# Patient Record
Sex: FEMALE | Race: WHITE | Employment: OTHER | URBAN - METROPOLITAN AREA
[De-identification: names, ages, dates, MRNs, and addresses within clinical notes are randomized per-mention and may not be internally consistent; named-entity substitution may affect disease eponyms.]

---

## 2023-12-28 PROCEDURE — 93270 REMOTE 30 DAY ECG REV/REPORT: CPT | Performed by: INTERNAL MEDICINE

## 2024-01-09 ENCOUNTER — OFFICE VISIT (OUTPATIENT)
Dept: PULMONOLOGY | Age: 74
End: 2024-01-09
Payer: MEDICARE

## 2024-01-09 VITALS
BODY MASS INDEX: 29.23 KG/M2 | WEIGHT: 165 LBS | HEIGHT: 63 IN | DIASTOLIC BLOOD PRESSURE: 78 MMHG | SYSTOLIC BLOOD PRESSURE: 128 MMHG | HEART RATE: 70 BPM | OXYGEN SATURATION: 97 %

## 2024-01-09 DIAGNOSIS — J44.9 COPD, SEVERITY TO BE DETERMINED (HCC): Primary | ICD-10-CM

## 2024-01-09 PROCEDURE — 99213 OFFICE O/P EST LOW 20 MIN: CPT | Performed by: INTERNAL MEDICINE

## 2024-01-09 PROCEDURE — 1123F ACP DISCUSS/DSCN MKR DOCD: CPT | Performed by: INTERNAL MEDICINE

## 2024-01-09 RX ORDER — ALBUTEROL SULFATE 90 UG/1
2 AEROSOL, METERED RESPIRATORY (INHALATION) 4 TIMES DAILY PRN
Qty: 18 G | Refills: 5 | Status: SHIPPED | OUTPATIENT
Start: 2024-01-09

## 2024-01-09 ASSESSMENT — ENCOUNTER SYMPTOMS
RHINORRHEA: 0
COLOR CHANGE: 0
WHEEZING: 0
SORE THROAT: 0
BLOOD IN STOOL: 0
ABDOMINAL DISTENTION: 0
DIARRHEA: 0
VOICE CHANGE: 0
CONSTIPATION: 0
CHOKING: 0
SHORTNESS OF BREATH: 1
VOMITING: 0
SINUS PRESSURE: 0
CHEST TIGHTNESS: 0
STRIDOR: 0
COUGH: 0
ABDOMINAL PAIN: 0
BACK PAIN: 0
APNEA: 0

## 2024-01-09 NOTE — PROGRESS NOTES
Kinza Puente    YOB: 1950     Date of Service:  1/9/2024     Chief Complaint   Patient presents with    Follow-Up from Hospital    Other     95% ROOM AIR AT REST- 87% ROOM AIR WALKING - 97% 3L OXYGEN WALKING       HPI patient was recently hospitalized between 12/26 and 12/28 of COPD also was noted to have paroxysmal atrial fibrillation with RVR.  Started on Stiolto Respimat and Eliquis for AC.  Patient states that her dyspnea with exertion is improved with use of inhaler, does not have rescue inhaler.  She is currently using 3 L O2 continuously.  It is that she quit smoking cigarettes approximately 3 weeks ago since her hospitalization.    No Known Allergies  Outpatient Medications Marked as Taking for the 1/9/24 encounter (Office Visit) with Jet Hernandez MD   Medication Sig Dispense Refill    albuterol sulfate HFA (VENTOLIN HFA) 108 (90 Base) MCG/ACT inhaler Inhale 2 puffs into the lungs 4 times daily as needed for Wheezing 18 g 5    rosuvastatin (CRESTOR) 10 MG tablet Take 1 tablet by mouth nightly 30 tablet 3    tiotropium-olodaterol (STIOLTO) 2.5-2.5 MCG/ACT AERS Inhale 2 puffs into the lungs daily 1 each 2    apixaban (ELIQUIS) 5 MG TABS tablet Take 1 tablet by mouth 2 times daily 60 tablet 2    metoprolol tartrate (LOPRESSOR) 25 MG tablet Take 0.5 tablets by mouth 2 times daily 30 tablet 2    Multiple Vitamins-Minerals (THERAPEUTIC MULTIVITAMIN-MINERALS) tablet Take 1 tablet by mouth daily           There is no immunization history on file for this patient.    History reviewed. No pertinent past medical history.  History reviewed. No pertinent surgical history.  Family History   Problem Relation Age of Onset    Cancer Mother     Cancer Father        Review of Systems:  Review of Systems   Constitutional:  Positive for fatigue. Negative for activity change, appetite change, fever and unexpected weight change.   HENT:  Negative for congestion, ear discharge, ear pain, 
complains of pain/discomfort

## 2024-01-29 PROCEDURE — 93272 ECG/REVIEW INTERPRET ONLY: CPT | Performed by: INTERNAL MEDICINE

## 2024-01-31 NOTE — PROGRESS NOTES
estimated from 55 % to 60 %.   Normal diastolic function. Mild mitral regurgitation. Moderate to severe aortic stenosis.   Mild tricuspid regurgitation. RVSP is 20.31 mmHg with RAP of 3 mmHg.    GXT: None    Monitor: 12/23  NSR  AF burden 6%    Assessment:    1.Paroxysmal Atrial Fibrillation  - Currently in AF  - Continue metoprolol 12.5 mg BID  - [id number] score:high; [id number] Vasc score and anticoagulation discussed. High risk for stroke and thromboembolism. Anticoagulation is recommended. Risk of bleeding was discussed.  ~ Continue Eliquis 5 mg BID. Monitor for bleeding                 - She is back in atrial fibrillation today. Her monitor showed a burden of 6% (longest episode 43 minutes). She is symptomatic with her AF. She states that she can tell when she is out of rhythm. Will increase her BB dose and see if this helps. If no improvement, we can try her on an alternative AAD to help suppress her AF. Hopefully she will be a candidate for TAVR valve and we can consider ablation after that time    2. Valvular Insufficiency              - Noted on echo. Moderate to severe AS              - Dr. Gilmore following. Plans for repeat echo in a few weeks. She is very hopeful that she will be a candidate for a valve     3. COPD              - Stable, pt reports she wears 3L of oxygen at home (not wearing today, but she is 94% resting)    ~ Recommend she wear oxygen with her daily activity as needed              - Pulmonary following     4.Tobacco Abuse              - Pt has stopped smoking              - Discussed benefits of cessation and risks of continued use              - Tobacco cessation counseling completed    Plan:  Increase metoprolol to 25 mg twice per day  Call if symptoms do not improve  Complete echo as scheduled    F/U: Follow-up with Dr. Gilomre as scheduled and EP in 6 weeks  -Call [facility] at [phone] with any questions    Diet & Exercise:  The patient is counseled to follow a low

## 2024-02-05 DIAGNOSIS — I48.0 PAF (PAROXYSMAL ATRIAL FIBRILLATION) (HCC): Primary | ICD-10-CM

## 2024-02-05 DIAGNOSIS — I48.91 ATRIAL FIBRILLATION, UNSPECIFIED TYPE (HCC): ICD-10-CM

## 2024-02-20 ENCOUNTER — OFFICE VISIT (OUTPATIENT)
Dept: PULMONOLOGY | Age: 74
End: 2024-02-20
Payer: MEDICARE

## 2024-02-20 VITALS
OXYGEN SATURATION: 99 % | HEART RATE: 69 BPM | WEIGHT: 170 LBS | DIASTOLIC BLOOD PRESSURE: 70 MMHG | HEIGHT: 63 IN | BODY MASS INDEX: 30.12 KG/M2 | SYSTOLIC BLOOD PRESSURE: 128 MMHG

## 2024-02-20 DIAGNOSIS — J44.9 COPD, SEVERITY TO BE DETERMINED (HCC): Primary | ICD-10-CM

## 2024-02-20 PROCEDURE — 1123F ACP DISCUSS/DSCN MKR DOCD: CPT | Performed by: INTERNAL MEDICINE

## 2024-02-20 PROCEDURE — 99213 OFFICE O/P EST LOW 20 MIN: CPT | Performed by: INTERNAL MEDICINE

## 2024-02-20 ASSESSMENT — ENCOUNTER SYMPTOMS
APNEA: 0
ABDOMINAL DISTENTION: 0
SINUS PRESSURE: 0
COLOR CHANGE: 0
CONSTIPATION: 0
CHEST TIGHTNESS: 0
VOMITING: 0
BACK PAIN: 0
RHINORRHEA: 0
SORE THROAT: 0
ABDOMINAL PAIN: 0
DIARRHEA: 0
CHOKING: 0
STRIDOR: 0
VOICE CHANGE: 0
SHORTNESS OF BREATH: 1
COUGH: 0
WHEEZING: 0
BLOOD IN STOOL: 0

## 2024-02-20 NOTE — PROGRESS NOTES
Kinza Puente    YOB: 1950     Date of Service:  2/20/2024     Chief Complaint   Patient presents with    Follow-up     6 wk    COPD       HPI dyspnea with exertion has improved.  Patient actually is no longer using Stiolto Respimat, since she ran out of refills-only has albuterol inhaler which she uses as needed.  Currently using 3 L O2 continuously.  Has not yet obtained complete PFT study as ordered previously.  Quit smoking approximately 2 months ago.    No Known Allergies  Outpatient Medications Marked as Taking for the 2/20/24 encounter (Office Visit) with Jet Hernandez MD   Medication Sig Dispense Refill    tiotropium-olodaterol (STIOLTO) 2.5-2.5 MCG/ACT AERS Inhale 2 puffs into the lungs daily 1 each 3    albuterol sulfate HFA (VENTOLIN HFA) 108 (90 Base) MCG/ACT inhaler Inhale 2 puffs into the lungs 4 times daily as needed for Wheezing 18 g 5    rosuvastatin (CRESTOR) 10 MG tablet Take 1 tablet by mouth nightly 30 tablet 3    apixaban (ELIQUIS) 5 MG TABS tablet Take 1 tablet by mouth 2 times daily 60 tablet 2    metoprolol tartrate (LOPRESSOR) 25 MG tablet Take 0.5 tablets by mouth 2 times daily 30 tablet 2    Multiple Vitamins-Minerals (THERAPEUTIC MULTIVITAMIN-MINERALS) tablet Take 1 tablet by mouth daily           There is no immunization history on file for this patient.    History reviewed. No pertinent past medical history.  History reviewed. No pertinent surgical history.  Family History   Problem Relation Age of Onset    Cancer Mother     Cancer Father        Review of Systems:  Review of Systems   Constitutional:  Negative for activity change, appetite change, fatigue, fever and unexpected weight change.   HENT:  Negative for congestion, ear discharge, ear pain, postnasal drip, rhinorrhea, sinus pressure, sneezing, sore throat, tinnitus and voice change.    Respiratory:  Positive for shortness of breath. Negative for apnea, cough, choking, chest tightness,

## 2024-03-01 ENCOUNTER — OFFICE VISIT (OUTPATIENT)
Dept: CARDIOLOGY CLINIC | Age: 74
End: 2024-03-01
Payer: MEDICARE

## 2024-03-01 VITALS
DIASTOLIC BLOOD PRESSURE: 76 MMHG | HEIGHT: 63 IN | OXYGEN SATURATION: 94 % | WEIGHT: 166 LBS | BODY MASS INDEX: 29.41 KG/M2 | HEART RATE: 76 BPM | SYSTOLIC BLOOD PRESSURE: 130 MMHG

## 2024-03-01 DIAGNOSIS — J44.9 CHRONIC OBSTRUCTIVE PULMONARY DISEASE, UNSPECIFIED COPD TYPE (HCC): ICD-10-CM

## 2024-03-01 DIAGNOSIS — I35.0 NONRHEUMATIC AORTIC VALVE STENOSIS: ICD-10-CM

## 2024-03-01 DIAGNOSIS — I48.0 PAF (PAROXYSMAL ATRIAL FIBRILLATION) (HCC): Primary | ICD-10-CM

## 2024-03-01 DIAGNOSIS — Z72.0 TOBACCO USE: ICD-10-CM

## 2024-03-01 PROCEDURE — 99214 OFFICE O/P EST MOD 30 MIN: CPT | Performed by: NURSE PRACTITIONER

## 2024-03-01 PROCEDURE — 93000 ELECTROCARDIOGRAM COMPLETE: CPT | Performed by: NURSE PRACTITIONER

## 2024-03-01 PROCEDURE — 1123F ACP DISCUSS/DSCN MKR DOCD: CPT | Performed by: NURSE PRACTITIONER

## 2024-03-13 ENCOUNTER — PATIENT MESSAGE (OUTPATIENT)
Dept: CARDIOLOGY CLINIC | Age: 74
End: 2024-03-13

## 2024-03-14 NOTE — TELEPHONE ENCOUNTER
From: Kinza Puente  To: Kyle Houston  Sent: 3/13/2024 6:31 PM EDT  Subject: Question about prescription     My prescription 'IC METOPROLOL TARTRATE' 25 MG. It is giving my fibulation. I am not feeling well. I am wondering If I can change prescription??

## 2024-03-15 NOTE — PROGRESS NOTES
*AF  Status Plan   Paroxysmal, monitor 2/24 Per EP, continue eliquis   *TOB  Status Prior smoker   PFTs NA   Plan Continued avoidance of 1st and 2nd hand smoke, counseled on risks/cessation  Needs PFTs   *FOLLOWUP  6 months

## 2024-03-19 ENCOUNTER — HOSPITAL ENCOUNTER (OUTPATIENT)
Dept: NON INVASIVE DIAGNOSTICS | Age: 74
Discharge: HOME | End: 2024-03-19
Payer: MEDICARE

## 2024-03-19 DIAGNOSIS — I35.0 AORTIC VALVE STENOSIS, NONRHEUMATIC: ICD-10-CM

## 2024-03-19 PROCEDURE — 93306 TTE W/DOPPLER COMPLETE: CPT

## 2024-03-21 ENCOUNTER — OFFICE VISIT (OUTPATIENT)
Dept: CARDIOLOGY CLINIC | Age: 74
End: 2024-03-21
Payer: MEDICARE

## 2024-03-21 VITALS
BODY MASS INDEX: 29.02 KG/M2 | DIASTOLIC BLOOD PRESSURE: 70 MMHG | HEIGHT: 63 IN | WEIGHT: 163.8 LBS | SYSTOLIC BLOOD PRESSURE: 112 MMHG | OXYGEN SATURATION: 96 % | HEART RATE: 71 BPM

## 2024-03-21 DIAGNOSIS — J44.9 CHRONIC OBSTRUCTIVE PULMONARY DISEASE, UNSPECIFIED COPD TYPE (HCC): ICD-10-CM

## 2024-03-21 DIAGNOSIS — R06.02 SOB (SHORTNESS OF BREATH): ICD-10-CM

## 2024-03-21 DIAGNOSIS — I48.0 PAF (PAROXYSMAL ATRIAL FIBRILLATION) (HCC): ICD-10-CM

## 2024-03-21 DIAGNOSIS — I35.0 AORTIC VALVE STENOSIS, NONRHEUMATIC: Primary | ICD-10-CM

## 2024-03-21 PROCEDURE — 99214 OFFICE O/P EST MOD 30 MIN: CPT | Performed by: INTERNAL MEDICINE

## 2024-03-21 PROCEDURE — 1123F ACP DISCUSS/DSCN MKR DOCD: CPT | Performed by: INTERNAL MEDICINE

## 2024-03-27 ENCOUNTER — HOSPITAL ENCOUNTER (OUTPATIENT)
Dept: PULMONOLOGY | Age: 74
Discharge: HOME | End: 2024-03-27
Payer: MEDICARE

## 2024-03-27 DIAGNOSIS — J44.9 COPD, SEVERITY TO BE DETERMINED (HCC): ICD-10-CM

## 2024-03-27 DIAGNOSIS — R06.00 DYSPNEA, UNSPECIFIED TYPE: ICD-10-CM

## 2024-03-27 LAB
DLCO %PRED: 60 %
DLCO PRED: NORMAL
DLCO/VA %PRED: 60 %
DLCO/VA PRED: NORMAL
DLCO/VA: 2.59 ML/MIN/MMHG
DLCO: 10.44 ML/MIN/MMHG
EXPIRATORY TIME-POST: NORMAL
EXPIRATORY TIME: NORMAL
FEF 25-75 %CHNG: NORMAL
FEF 25-75 POST %PRED: NORMAL
FEF 25-75% %PRED-PRE: NORMAL
FEF 25-75% PRED: NORMAL
FEF 25-75-POST: NORMAL
FEF 25-75-PRE: NORMAL
FEV1 %PRED-POST: 62 %
FEV1 %PRED-PRE: 60 %
FEV1 PRED: NORMAL
FEV1-POST: NORMAL
FEV1-PRE: NORMAL
FEV1/FVC %PRED-POST: NORMAL
FEV1/FVC %PRED-PRE: NORMAL
FEV1/FVC PRED: NORMAL
FEV1/FVC-POST: 63 %
FEV1/FVC-PRE: 62 %
FVC %PRED-POST: 77 L
FVC %PRED-PRE: 75 %
FVC PRED: NORMAL
FVC-POST: 1.89 L
FVC-PRE: 1.83 L
GAW %PRED: NORMAL
GAW PRED: NORMAL
GAW: NORMAL
IC PRE %PRED: NORMAL
IC PRED: NORMAL
IC: NORMAL
MEP: NORMAL
MIP: NORMAL
MVV %PRED-PRE: NORMAL
MVV PRED: NORMAL
MVV-PRE: NORMAL
PEF %PRED-POST: NORMAL
PEF %PRED-PRE: NORMAL
PEF PRED: NORMAL
PEF%CHNG: NORMAL
PEF-POST: NORMAL
PEF-PRE: NORMAL
RAW %PRED: NORMAL
RAW PRED: NORMAL
RAW: NORMAL
RV PRE %PRED: NORMAL
RV PRED: NORMAL
RV: NORMAL
SVC %PRED: NORMAL
SVC PRED: NORMAL
SVC: NORMAL
TLC PRE %PRED: 102 %
TLC PRED: NORMAL
TLC: NORMAL
VA %PRED: 99 %
VA PRED: NORMAL
VA: 4.03 L
VTG %PRED: NORMAL
VTG PRED: NORMAL
VTG: NORMAL

## 2024-03-27 PROCEDURE — 94664 DEMO&/EVAL PT USE INHALER: CPT

## 2024-03-27 PROCEDURE — 94726 PLETHYSMOGRAPHY LUNG VOLUMES: CPT

## 2024-03-27 PROCEDURE — 94729 DIFFUSING CAPACITY: CPT

## 2024-03-27 PROCEDURE — 94640 AIRWAY INHALATION TREATMENT: CPT

## 2024-03-27 PROCEDURE — 6370000000 HC RX 637 (ALT 250 FOR IP): Performed by: INTERNAL MEDICINE

## 2024-03-27 PROCEDURE — 94060 EVALUATION OF WHEEZING: CPT

## 2024-03-27 RX ORDER — ALBUTEROL SULFATE 90 UG/1
4 AEROSOL, METERED RESPIRATORY (INHALATION) ONCE
Status: COMPLETED | OUTPATIENT
Start: 2024-03-27 | End: 2024-03-27

## 2024-03-27 RX ADMIN — ALBUTEROL SULFATE 4 PUFF: 90 AEROSOL, METERED RESPIRATORY (INHALATION) at 12:25

## 2024-03-27 ASSESSMENT — PULMONARY FUNCTION TESTS
FEV1/FVC_POST: 63
FEV1/FVC_PRE: 62
FVC_PERCENT_PREDICTED_POST: 77
FVC_PERCENT_PREDICTED_PRE: 75
FEV1_PERCENT_PREDICTED_PRE: 60
FVC_PRE: 1.83
FVC_POST: 1.89
FEV1_PERCENT_PREDICTED_POST: 62

## 2024-03-28 NOTE — PROCEDURES
Spirometry was acceptable and reproducible by ATS standards    Spirometry  Spirometry is normal.  Spirometry shows moderately severe obstructive defect.    Bronchodilator  There is no response to bronchodilator demonstrated.   [Increase in FEV1 and/or FVC => 12% of control and => 200 ml]    Lung Volumes  Lung volumes are normal.    Diffusing Capacity  Diffusing capacity isdecreased.                         [>60% and <LLN]      Overall Interpretation  Moderate obstruction with FEV1 of 60% without bronchodilator response.  Diffusion capacity reduced to 60%.  Normal lung volumes.  This is consistent with moderate COPD.    Pulmonary Function Testing Results:    FEV1 %Pred-Pre   Date Value Ref Range Status   03/27/2024 60 % Final     FEV1 %Pred-Post   Date Value Ref Range Status   03/27/2024 62 % Final     FEV1/FVC-Pre   Date Value Ref Range Status   03/27/2024 62 % Final     FEV1/FVC-Post   Date Value Ref Range Status   03/27/2024 63 % Final     TLC Pre %Pred   Date Value Ref Range Status   03/27/2024 102 % Final     DLCO %Pred   Date Value Ref Range Status   03/27/2024 60 % Final     DLCO/VA %Pred   Date Value Ref Range Status   03/27/2024 60 % Final             Obstruction severity and Restriction Severity using FEV1 %  Gold Stage Severity per ERS/ATS FEV1 % per ERS/ATS   GOLD I:  FEV1>80% Mild COPD >70   GOLD II:  FEV1 50-79% Moderate 50-70   GOLD III:  FEV1 30-49% Severe 35-50   GOLD IV:  FEV1 <30 Very Severe  <35       DCLO:  Normal:  %  Mild:  65-70%  Moderate:  41-60%  Severe:  <40%      PFT data will be scanned into the procedure tab in epic under this encounter    Juventino Drake MD  [facility]

## 2024-04-18 PROBLEM — J96.02 ACUTE RESPIRATORY FAILURE WITH HYPOXIA AND HYPERCAPNIA (HCC): Status: RESOLVED | Noted: 2023-12-27 | Resolved: 2024-04-18

## 2024-04-18 PROBLEM — J96.01 ACUTE RESPIRATORY FAILURE WITH HYPOXIA AND HYPERCAPNIA (HCC): Status: RESOLVED | Noted: 2023-12-27 | Resolved: 2024-04-18

## 2024-04-18 NOTE — PROGRESS NOTES
myocardial infarction and death were discussed in detail. I explained the success rate considering possible need for a second procedure is about 60-70% and with addition of anti arrhythmics is about 80%. We discussed the need for repeat procedure. On average patients need more than one ablation procedure    -------- > The patient opted to proceed with the ablation. Hold eliquis 1 day prior    2. Aortic Stenosis              - Noted on echo. Moderate AS              - Dr. Gilmore following     3. COPD              - Moderate per PFTs  - Stable, on room air              - Pulmonary following     4.Tobacco Abuse              - Pt has stopped smoking              - Discussed benefits of cessation and risks of continued use              - Tobacco cessation counseling completed    5. Leg Pain/Cramps   - Occurs at night. Hold statin for one week. If pain goes away, consider switching to lower intensity statin. If pain does not go away, resume current statin    Plan:  No changes. Ok to stop statin for one week to see if leg pains go away  Will arrange ablation    F/U: Follow-up with EP following ablation  -Call [facility] at [phone] with any questions    Diet & Exercise:  The patient is counseled to follow a low salt diet to assure blood pressure remains controlled for cardiovascular risk factor modification  The patient is counseled to avoid excess caffeine, and energy drinks as this may exacerbated ectopy and arrhythmia  The patient is counseled to lose weight to control cardiovascular risk factors  Exercise program discussed: To improve overall cardiovascular health, the patient is instructed to increase cardiovascular related activities with a goal of 150 min/week of moderate level activity or 10,000 steps per day. Encouraged to perform as much activity as tolerated    I have addressed the patient's cardiac risk factors and adjusted pharmacologic treatment as needed. In addition, I have reinforced the

## 2024-04-22 ENCOUNTER — OFFICE VISIT (OUTPATIENT)
Dept: CARDIOLOGY CLINIC | Age: 74
End: 2024-04-22
Payer: MEDICARE

## 2024-04-22 VITALS
HEART RATE: 56 BPM | OXYGEN SATURATION: 96 % | SYSTOLIC BLOOD PRESSURE: 130 MMHG | WEIGHT: 170.4 LBS | BODY MASS INDEX: 30.19 KG/M2 | DIASTOLIC BLOOD PRESSURE: 82 MMHG | HEIGHT: 63 IN

## 2024-04-22 DIAGNOSIS — I48.0 PAF (PAROXYSMAL ATRIAL FIBRILLATION) (HCC): Primary | ICD-10-CM

## 2024-04-22 DIAGNOSIS — I35.0 NONRHEUMATIC AORTIC VALVE STENOSIS: ICD-10-CM

## 2024-04-22 DIAGNOSIS — J44.9 CHRONIC OBSTRUCTIVE PULMONARY DISEASE, UNSPECIFIED COPD TYPE (HCC): ICD-10-CM

## 2024-04-22 DIAGNOSIS — Z72.0 TOBACCO USE: ICD-10-CM

## 2024-04-22 PROCEDURE — 99214 OFFICE O/P EST MOD 30 MIN: CPT | Performed by: NURSE PRACTITIONER

## 2024-04-22 PROCEDURE — 1123F ACP DISCUSS/DSCN MKR DOCD: CPT | Performed by: NURSE PRACTITIONER

## 2024-04-22 PROCEDURE — 93000 ELECTROCARDIOGRAM COMPLETE: CPT | Performed by: NURSE PRACTITIONER

## 2024-05-20 ENCOUNTER — TELEPHONE (OUTPATIENT)
Dept: CARDIOLOGY CLINIC | Age: 74
End: 2024-05-20

## 2024-05-20 NOTE — TELEPHONE ENCOUNTER
LVM for pt to advise I don't have any dates avail for procedure. I will be in contact soon to get her scheduled.     Procedure - EP Study/HALLIE/Afib Abl   Date:  Arrival time:  Procedure time:      ATRIAL FIBRILLATION ABLATION INFORMATION  Our  will be contacting you with a date and time for your procedure  The morning of your ablation you will need to check in at the registration desk in the main lobby.                 PRE-PROCEDURE INSTRUCTIONS -   -Do not eat or drink anything after midnight the night before your ablation.  -You will need to have a responsible adult to drive you home.  -Your nurse will provide you with discharge instructions.  -You will need to hold your Eliquis for 1 day  -If you are taking a diuretic (water pill) please hold the morning of the procedure  -If you take long acting insulin, take 1/2 the dose the evening before or morning of depending on when you take your dosage.  -You may take all of your other medications with a sip of water.     You will be scheduled for a 6-8 week follow up with cardiology.  This will be done prior to your discharge instructions.     If you have any questions regarding the procedure itself or medications, please call [phone] and ask to speak to an EP nurse.

## 2024-05-24 ENCOUNTER — OFFICE VISIT (OUTPATIENT)
Dept: PULMONOLOGY | Age: 74
End: 2024-05-24

## 2024-05-24 VITALS
HEART RATE: 63 BPM | WEIGHT: 172.2 LBS | HEIGHT: 63 IN | DIASTOLIC BLOOD PRESSURE: 80 MMHG | SYSTOLIC BLOOD PRESSURE: 132 MMHG | BODY MASS INDEX: 30.51 KG/M2 | OXYGEN SATURATION: 90 %

## 2024-05-24 DIAGNOSIS — F17.211 CIGARETTE NICOTINE DEPENDENCE IN REMISSION: ICD-10-CM

## 2024-05-24 DIAGNOSIS — J44.9 COPD, MODERATE (HCC): Primary | ICD-10-CM

## 2024-05-24 DIAGNOSIS — I48.0 PAF (PAROXYSMAL ATRIAL FIBRILLATION) (HCC): ICD-10-CM

## 2024-05-24 ASSESSMENT — ENCOUNTER SYMPTOMS
DIARRHEA: 0
BACK PAIN: 0
COUGH: 1
VOICE CHANGE: 0
RHINORRHEA: 0
COLOR CHANGE: 0
CHOKING: 0
ABDOMINAL DISTENTION: 0
SINUS PRESSURE: 0
WHEEZING: 0
SORE THROAT: 0
CHEST TIGHTNESS: 0
CONSTIPATION: 0
APNEA: 0
BLOOD IN STOOL: 0
ABDOMINAL PAIN: 0
STRIDOR: 0
SHORTNESS OF BREATH: 1
VOMITING: 0

## 2024-05-24 NOTE — PROGRESS NOTES
Kinza Puente    YOB: 1950     Date of Service:  5/24/2024     Chief Complaint   Patient presents with    COPD    Results     PFT    Shortness of Breath     With minimal exertion        HPI patient states that she continues to be dyspneic with any form of exertion, has increased fatigue and congested cough.  States that she returned her home oxygen recently, since she did not feel that she needed it anymore.  Ran out of refill a few weeks ago of Stiolto, had some increased shortness of breath and cough-now better since she is back on Stiolto.  Quit smoking in December.    No Known Allergies  Outpatient Medications Marked as Taking for the 5/24/24 encounter (Office Visit) with Jet Hernandez MD   Medication Sig Dispense Refill    tiotropium-olodaterol (STIOLTO) 2.5-2.5 MCG/ACT AERS Inhale 2 puffs into the lungs daily 1 each 3    metoprolol tartrate (LOPRESSOR) 25 MG tablet TAKE 1/2 TABLET TWICE A DAY BY MOUTH 90 tablet 0    apixaban (ELIQUIS) 5 MG TABS tablet Take 1 tablet by mouth 2 times daily 60 tablet 2    rosuvastatin (CRESTOR) 10 MG tablet Take 1 tablet by mouth nightly 30 tablet 3    albuterol sulfate HFA (VENTOLIN HFA) 108 (90 Base) MCG/ACT inhaler Inhale 2 puffs into the lungs 4 times daily as needed for Wheezing 18 g 5    Multiple Vitamins-Minerals (THERAPEUTIC MULTIVITAMIN-MINERALS) tablet Take 1 tablet by mouth daily           There is no immunization history on file for this patient.    No past medical history on file.  No past surgical history on file.  Family History   Problem Relation Age of Onset    Cancer Mother     Cancer Father        Review of Systems:  Review of Systems   Constitutional:  Positive for fatigue. Negative for activity change, appetite change, fever and unexpected weight change.   HENT:  Negative for congestion, ear discharge, ear pain, postnasal drip, rhinorrhea, sinus pressure, sneezing, sore throat, tinnitus and voice change.    Respiratory:

## 2024-06-13 ENCOUNTER — TELEPHONE (OUTPATIENT)
Dept: CARDIOLOGY CLINIC | Age: 74
End: 2024-06-13

## 2024-06-13 DIAGNOSIS — I48.0 PAROXYSMAL ATRIAL FIBRILLATION (HCC): Primary | ICD-10-CM

## 2024-06-13 NOTE — TELEPHONE ENCOUNTER
Spoke with the pt and got her scheduled for procedure. We went over instructions below and she verbalized understanding.     ATRIAL FIBRILLATION ABLATION INFORMATION  Date: 8/6/24  Arrival time: 9:30 am   Procedure time: 10:30 am     Our  will be contacting you with a date and time for your procedure  The morning of your ablation you will need to check in at the registration desk in the main lobby.                 PRE-PROCEDURE INSTRUCTIONS -   -Do not eat or drink anything after midnight the night before your ablation.  -You will need to have a responsible adult to drive you home.  -Your nurse will provide you with discharge instructions.  -You will need to hold your Eliquis for 1 day  -If you are taking a diuretic (water pill) please hold the morning of the procedure  -If you take long acting insulin, take 1/2 the dose the evening before or morning of depending on when you take your dosage.  -You may take all of your other medications with a sip of water.     You will be scheduled for a 6-8 week follow up with cardiology.  This will be done prior to your discharge instructions.     If you have any questions regarding the procedure itself or medications, please call [phone] and ask to speak to an EP nurse.     Qgenda updated / updated cupid, carto, epic / emailed cath lab

## 2024-08-06 ENCOUNTER — HOSPITAL ENCOUNTER (OUTPATIENT)
Age: 74
Setting detail: OUTPATIENT SURGERY
Discharge: HOME | End: 2024-08-06
Attending: INTERNAL MEDICINE | Admitting: INTERNAL MEDICINE
Payer: MEDICARE

## 2024-08-06 ENCOUNTER — ANESTHESIA (OUTPATIENT)
Age: 74
End: 2024-08-06
Payer: MEDICARE

## 2024-08-06 ENCOUNTER — ANESTHESIA EVENT (OUTPATIENT)
Age: 74
End: 2024-08-06
Payer: MEDICARE

## 2024-08-06 ENCOUNTER — APPOINTMENT (OUTPATIENT)
Age: 74
End: 2024-08-06
Attending: INTERNAL MEDICINE
Payer: MEDICARE

## 2024-08-06 VITALS
HEART RATE: 73 BPM | SYSTOLIC BLOOD PRESSURE: 162 MMHG | OXYGEN SATURATION: 95 % | TEMPERATURE: 96.9 F | HEIGHT: 63 IN | WEIGHT: 172 LBS | DIASTOLIC BLOOD PRESSURE: 96 MMHG | BODY MASS INDEX: 30.48 KG/M2 | RESPIRATION RATE: 24 BRPM

## 2024-08-06 DIAGNOSIS — I48.0 PAROXYSMAL ATRIAL FIBRILLATION (HCC): ICD-10-CM

## 2024-08-06 LAB
ANION GAP SERPL CALCULATED.3IONS-SCNC: 11 MMOL/L (ref 3–16)
BUN SERPL-MCNC: 14 MG/DL (ref 7–20)
CALCIUM SERPL-MCNC: 9.2 MG/DL (ref 8.3–10.6)
CHLORIDE SERPL-SCNC: 104 MMOL/L (ref 99–110)
CO2 SERPL-SCNC: 25 MMOL/L (ref 21–32)
CREAT SERPL-MCNC: <0.5 MG/DL (ref 0.6–1.2)
DEPRECATED RDW RBC AUTO: 14.3 % (ref 12.4–15.4)
ECHO BSA: 1.86 M2
GFR SERPLBLD CREATININE-BSD FMLA CKD-EPI: >90 ML/MIN/{1.73_M2}
GLUCOSE SERPL-MCNC: 114 MG/DL (ref 70–99)
HCT VFR BLD AUTO: 38.5 % (ref 36–48)
HGB BLD-MCNC: 12.7 G/DL (ref 12–16)
MCH RBC QN AUTO: 28.8 PG (ref 26–34)
MCHC RBC AUTO-ENTMCNC: 32.9 G/DL (ref 31–36)
MCV RBC AUTO: 87.4 FL (ref 80–100)
PLATELET # BLD AUTO: 197 K/UL (ref 135–450)
PMV BLD AUTO: 7.6 FL (ref 5–10.5)
POC ACT LR: 304 SEC
POC ACT LR: 374 SEC
POC ACT LR: 375 SEC
POTASSIUM SERPL-SCNC: 4.2 MMOL/L (ref 3.5–5.1)
RBC # BLD AUTO: 4.4 M/UL (ref 4–5.2)
SODIUM SERPL-SCNC: 140 MMOL/L (ref 136–145)
WBC # BLD AUTO: 5.7 K/UL (ref 4–11)

## 2024-08-06 PROCEDURE — 93312 ECHO TRANSESOPHAGEAL: CPT | Performed by: INTERNAL MEDICINE

## 2024-08-06 PROCEDURE — 2500000003 HC RX 250 WO HCPCS: Performed by: NURSE ANESTHETIST, CERTIFIED REGISTERED

## 2024-08-06 PROCEDURE — 2709999900 HC NON-CHARGEABLE SUPPLY: Performed by: INTERNAL MEDICINE

## 2024-08-06 PROCEDURE — 6360000002 HC RX W HCPCS: Performed by: NURSE ANESTHETIST, CERTIFIED REGISTERED

## 2024-08-06 PROCEDURE — 93656 COMPRE EP EVAL ABLTJ ATR FIB: CPT | Performed by: INTERNAL MEDICINE

## 2024-08-06 PROCEDURE — 93312 ECHO TRANSESOPHAGEAL: CPT

## 2024-08-06 PROCEDURE — 93320 DOPPLER ECHO COMPLETE: CPT | Performed by: INTERNAL MEDICINE

## 2024-08-06 PROCEDURE — 80048 BASIC METABOLIC PNL TOTAL CA: CPT

## 2024-08-06 PROCEDURE — C1894 INTRO/SHEATH, NON-LASER: HCPCS | Performed by: INTERNAL MEDICINE

## 2024-08-06 PROCEDURE — 3700000001 HC ADD 15 MINUTES (ANESTHESIA): Performed by: INTERNAL MEDICINE

## 2024-08-06 PROCEDURE — C1760 CLOSURE DEV, VASC: HCPCS | Performed by: INTERNAL MEDICINE

## 2024-08-06 PROCEDURE — 7100000000 HC PACU RECOVERY - FIRST 15 MIN: Performed by: INTERNAL MEDICINE

## 2024-08-06 PROCEDURE — C1759 CATH, INTRA ECHOCARDIOGRAPHY: HCPCS | Performed by: INTERNAL MEDICINE

## 2024-08-06 PROCEDURE — 93325 DOPPLER ECHO COLOR FLOW MAPG: CPT | Performed by: INTERNAL MEDICINE

## 2024-08-06 PROCEDURE — 7100000001 HC PACU RECOVERY - ADDTL 15 MIN: Performed by: INTERNAL MEDICINE

## 2024-08-06 PROCEDURE — C1769 GUIDE WIRE: HCPCS | Performed by: INTERNAL MEDICINE

## 2024-08-06 PROCEDURE — C1766 INTRO/SHEATH,STRBLE,NON-PEEL: HCPCS | Performed by: INTERNAL MEDICINE

## 2024-08-06 PROCEDURE — 3700000000 HC ANESTHESIA ATTENDED CARE: Performed by: INTERNAL MEDICINE

## 2024-08-06 PROCEDURE — 2500000003 HC RX 250 WO HCPCS: Performed by: INTERNAL MEDICINE

## 2024-08-06 PROCEDURE — C1730 CATH, EP, 19 OR FEW ELECT: HCPCS | Performed by: INTERNAL MEDICINE

## 2024-08-06 PROCEDURE — 7100000010 HC PHASE II RECOVERY - FIRST 15 MIN: Performed by: INTERNAL MEDICINE

## 2024-08-06 PROCEDURE — 36415 COLL VENOUS BLD VENIPUNCTURE: CPT

## 2024-08-06 PROCEDURE — 2580000003 HC RX 258: Performed by: NURSE ANESTHETIST, CERTIFIED REGISTERED

## 2024-08-06 PROCEDURE — 93005 ELECTROCARDIOGRAM TRACING: CPT | Performed by: INTERNAL MEDICINE

## 2024-08-06 PROCEDURE — C1733 CATH, EP, OTHR THAN COOL-TIP: HCPCS | Performed by: INTERNAL MEDICINE

## 2024-08-06 PROCEDURE — 85027 COMPLETE CBC AUTOMATED: CPT

## 2024-08-06 PROCEDURE — 7100000011 HC PHASE II RECOVERY - ADDTL 15 MIN: Performed by: INTERNAL MEDICINE

## 2024-08-06 PROCEDURE — 85347 COAGULATION TIME ACTIVATED: CPT

## 2024-08-06 RX ORDER — ATROPINE SULFATE 0.4 MG/ML
INJECTION, SOLUTION INTRAMUSCULAR; INTRAVENOUS; SUBCUTANEOUS PRN
Status: DISCONTINUED | OUTPATIENT
Start: 2024-08-06 | End: 2024-08-06 | Stop reason: SDUPTHER

## 2024-08-06 RX ORDER — FENTANYL CITRATE 50 UG/ML
INJECTION, SOLUTION INTRAMUSCULAR; INTRAVENOUS PRN
Status: DISCONTINUED | OUTPATIENT
Start: 2024-08-06 | End: 2024-08-06 | Stop reason: SDUPTHER

## 2024-08-06 RX ORDER — LIDOCAINE HYDROCHLORIDE 20 MG/ML
INJECTION, SOLUTION EPIDURAL; INFILTRATION; INTRACAUDAL; PERINEURAL PRN
Status: DISCONTINUED | OUTPATIENT
Start: 2024-08-06 | End: 2024-08-06 | Stop reason: SDUPTHER

## 2024-08-06 RX ORDER — SODIUM CHLORIDE 9 MG/ML
INJECTION, SOLUTION INTRAVENOUS CONTINUOUS PRN
Status: DISCONTINUED | OUTPATIENT
Start: 2024-08-06 | End: 2024-08-06 | Stop reason: SDUPTHER

## 2024-08-06 RX ORDER — PROPOFOL 10 MG/ML
INJECTION, EMULSION INTRAVENOUS PRN
Status: DISCONTINUED | OUTPATIENT
Start: 2024-08-06 | End: 2024-08-06 | Stop reason: SDUPTHER

## 2024-08-06 RX ORDER — VECURONIUM BROMIDE 1 MG/ML
INJECTION, POWDER, LYOPHILIZED, FOR SOLUTION INTRAVENOUS PRN
Status: DISCONTINUED | OUTPATIENT
Start: 2024-08-06 | End: 2024-08-06 | Stop reason: SDUPTHER

## 2024-08-06 RX ORDER — MIDAZOLAM HYDROCHLORIDE 1 MG/ML
INJECTION INTRAMUSCULAR; INTRAVENOUS PRN
Status: DISCONTINUED | OUTPATIENT
Start: 2024-08-06 | End: 2024-08-06 | Stop reason: SDUPTHER

## 2024-08-06 RX ORDER — SODIUM CHLORIDE 0.9 % (FLUSH) 0.9 %
5-40 SYRINGE (ML) INJECTION EVERY 12 HOURS SCHEDULED
Status: DISCONTINUED | OUTPATIENT
Start: 2024-08-06 | End: 2024-08-06 | Stop reason: HOSPADM

## 2024-08-06 RX ORDER — SUCCINYLCHOLINE/SOD CL,ISO/PF 200MG/10ML
SYRINGE (ML) INTRAVENOUS PRN
Status: DISCONTINUED | OUTPATIENT
Start: 2024-08-06 | End: 2024-08-06 | Stop reason: SDUPTHER

## 2024-08-06 RX ORDER — SODIUM CHLORIDE 0.9 % (FLUSH) 0.9 %
5-40 SYRINGE (ML) INJECTION PRN
Status: DISCONTINUED | OUTPATIENT
Start: 2024-08-06 | End: 2024-08-06 | Stop reason: HOSPADM

## 2024-08-06 RX ORDER — PROTAMINE SULFATE 10 MG/ML
INJECTION, SOLUTION INTRAVENOUS PRN
Status: DISCONTINUED | OUTPATIENT
Start: 2024-08-06 | End: 2024-08-06 | Stop reason: SDUPTHER

## 2024-08-06 RX ORDER — DEXAMETHASONE SODIUM PHOSPHATE 4 MG/ML
INJECTION, SOLUTION INTRA-ARTICULAR; INTRALESIONAL; INTRAMUSCULAR; INTRAVENOUS; SOFT TISSUE PRN
Status: DISCONTINUED | OUTPATIENT
Start: 2024-08-06 | End: 2024-08-06 | Stop reason: SDUPTHER

## 2024-08-06 RX ORDER — SODIUM CHLORIDE 9 MG/ML
INJECTION, SOLUTION INTRAVENOUS PRN
Status: DISCONTINUED | OUTPATIENT
Start: 2024-08-06 | End: 2024-08-06 | Stop reason: HOSPADM

## 2024-08-06 RX ORDER — HEPARIN SODIUM 1000 [USP'U]/ML
INJECTION, SOLUTION INTRAVENOUS; SUBCUTANEOUS PRN
Status: DISCONTINUED | OUTPATIENT
Start: 2024-08-06 | End: 2024-08-06 | Stop reason: SDUPTHER

## 2024-08-06 RX ORDER — ONDANSETRON 2 MG/ML
INJECTION INTRAMUSCULAR; INTRAVENOUS PRN
Status: DISCONTINUED | OUTPATIENT
Start: 2024-08-06 | End: 2024-08-06 | Stop reason: SDUPTHER

## 2024-08-06 RX ADMIN — FENTANYL CITRATE 50 MCG: 50 INJECTION, SOLUTION INTRAMUSCULAR; INTRAVENOUS at 10:24

## 2024-08-06 RX ADMIN — VECURONIUM BROMIDE 6 MG: 1 INJECTION, POWDER, LYOPHILIZED, FOR SOLUTION INTRAVENOUS at 11:26

## 2024-08-06 RX ADMIN — FENTANYL CITRATE 25 MCG: 50 INJECTION, SOLUTION INTRAMUSCULAR; INTRAVENOUS at 11:57

## 2024-08-06 RX ADMIN — ATROPINE SULFATE 1 MG: 0.4 INJECTION, SOLUTION INTRAMUSCULAR; INTRAVENOUS; SUBCUTANEOUS at 11:40

## 2024-08-06 RX ADMIN — HEPARIN SODIUM 5000 UNITS: 1000 INJECTION INTRAVENOUS; SUBCUTANEOUS at 11:49

## 2024-08-06 RX ADMIN — DEXAMETHASONE SODIUM PHOSPHATE 8 MG: 4 INJECTION, SOLUTION INTRAMUSCULAR; INTRAVENOUS at 10:27

## 2024-08-06 RX ADMIN — FENTANYL CITRATE 25 MCG: 50 INJECTION, SOLUTION INTRAMUSCULAR; INTRAVENOUS at 11:59

## 2024-08-06 RX ADMIN — ONDANSETRON 4 MG: 2 INJECTION INTRAMUSCULAR; INTRAVENOUS at 10:27

## 2024-08-06 RX ADMIN — Medication 100 MG: at 10:27

## 2024-08-06 RX ADMIN — PROPOFOL 80 MG: 10 INJECTION, EMULSION INTRAVENOUS at 10:27

## 2024-08-06 RX ADMIN — PHENYLEPHRINE HYDROCHLORIDE 100 MCG/MIN: 10 INJECTION INTRAVENOUS at 10:27

## 2024-08-06 RX ADMIN — HEPARIN SODIUM 13000 UNITS: 1000 INJECTION INTRAVENOUS; SUBCUTANEOUS at 11:35

## 2024-08-06 RX ADMIN — MIDAZOLAM 1 MG: 1 INJECTION INTRAMUSCULAR; INTRAVENOUS at 10:14

## 2024-08-06 RX ADMIN — LIDOCAINE HYDROCHLORIDE 100 MG: 20 INJECTION, SOLUTION EPIDURAL; INFILTRATION; INTRACAUDAL; PERINEURAL at 10:26

## 2024-08-06 RX ADMIN — HEPARIN SODIUM 2000 UNITS: 1000 INJECTION INTRAVENOUS; SUBCUTANEOUS at 12:04

## 2024-08-06 RX ADMIN — PROPOFOL 40 MG: 10 INJECTION, EMULSION INTRAVENOUS at 11:54

## 2024-08-06 RX ADMIN — PROTAMINE SULFATE 30 MG: 10 INJECTION, SOLUTION INTRAVENOUS at 12:23

## 2024-08-06 RX ADMIN — SODIUM CHLORIDE: 9 INJECTION, SOLUTION INTRAVENOUS at 10:10

## 2024-08-06 RX ADMIN — VECURONIUM BROMIDE 4 MG: 1 INJECTION, POWDER, LYOPHILIZED, FOR SOLUTION INTRAVENOUS at 10:27

## 2024-08-06 RX ADMIN — SUGAMMADEX 400 MG: 100 INJECTION, SOLUTION INTRAVENOUS at 12:23

## 2024-08-06 RX ADMIN — MIDAZOLAM 1 MG: 1 INJECTION INTRAMUSCULAR; INTRAVENOUS at 10:21

## 2024-08-06 ASSESSMENT — LIFESTYLE VARIABLES: SMOKING_STATUS: 0

## 2024-08-06 ASSESSMENT — COPD QUESTIONNAIRES: CAT_SEVERITY: MODERATE

## 2024-08-06 NOTE — H&P
[facility]   Electrophysiology    Chief Complaint:   Chief Complaint   Patient presents with    Atrial Fibrillation     No cardiac symptoms present.     Follow-up     History of Present Illness: History obtained from patient and medical record.    Kinza Puente is 73 y.o. female with a past medical history of tobacco use, COPD, AS, and PAF.    -Interval history: Today, Kinza Puente is being seen for routine follow up. She is doing better. She still has episodes of AF that last several hours. She is very symptomatic with the episodes (fatigue, SOB, and heart fluttering).     Denies having chest pain, palpitations, shortness of breath, orthopnea/PND, cough, or dizziness at the time of this visit.    With regard to medication therapy the patient has been compliant with prescribed regimen. They have tolerated therapy to date.     Allergies:  No Known Allergies    Home Medications:  Prior to Visit Medications    Medication Sig Taking? Authorizing Provider   metoprolol tartrate (LOPRESSOR) 25 MG tablet TAKE 1/2 TABLET TWICE A DAY BY MOUTH Yes Nneka Garza PA   apixaban (ELIQUIS) 5 MG TABS tablet Take 1 tablet by mouth 2 times daily Yes Beth Edwards MD   rosuvastatin (CRESTOR) 10 MG tablet Take 1 tablet by mouth nightly Yes Danielle Garcia APRN - CNP   tiotropium-olodaterol (STIOLTO) 2.5-2.5 MCG/ACT AERS Inhale 2 puffs into the lungs daily Yes Jet Hernandez MD   albuterol sulfate HFA (VENTOLIN HFA) 108 (90 Base) MCG/ACT inhaler Inhale 2 puffs into the lungs 4 times daily as needed for Wheezing Yes Jet Hernandez MD   Multiple Vitamins-Minerals (THERAPEUTIC MULTIVITAMIN-MINERALS) tablet Take 1 tablet by mouth daily Yes ProviderCathleen MD      Past Medical History:  No past medical history on file.    Past Surgical History:    has no past surgical history on file.     Social History:  Personally Reviewed.  reports that she quit smoking about 4 months ago.

## 2024-08-06 NOTE — ANESTHESIA POSTPROCEDURE EVALUATION
Department of Anesthesiology  Postprocedure Note    Patient: Kinza Puente  MRN: [id number]  YOB: 1950  Date of evaluation: 8/6/2024    Procedure Summary       Date: 08/06/24 Room / Location: [facility] EP LAB 3 / [facility] CARDIAC CATH LAB    Anesthesia Start: 1013 Anesthesia Stop:     Procedure: Ablation A-fib w complete ep study Diagnosis:       Paroxysmal atrial fibrillation (HCC)      (Paroxysmal atrial fibrillation (HCC) [I48.0])    Providers: Yousuf Grant MD Responsible Provider: Quoc Morley MD    Anesthesia Type: general ASA Status: 3            Anesthesia Type: No value filed.    Elena Phase I: Elena Score: 9    Elena Phase II:      Anesthesia Post Evaluation    Patient location during evaluation: PACU  Patient participation: complete - patient participated  Level of consciousness: awake  Airway patency: patent  Nausea & Vomiting: no vomiting and no nausea  Cardiovascular status: hemodynamically stable  Respiratory status: acceptable  Hydration status: stable  Multimodal analgesia pain management approach  Pain management: adequate    No notable events documented.

## 2024-08-06 NOTE — ANESTHESIA PRE PROCEDURE
tablet TAKE 1 TABLET BY MOUTH EVERY DAY AT NIGHT 90 tablet 1   • tiotropium-olodaterol (STIOLTO) 2.5-2.5 MCG/ACT AERS Inhale 2 puffs into the lungs daily 1 each 3   • albuterol sulfate HFA (VENTOLIN HFA) 108 (90 Base) MCG/ACT inhaler Inhale 2 puffs into the lungs 4 times daily as needed for Wheezing 18 g 5   • Multiple Vitamins-Minerals (THERAPEUTIC MULTIVITAMIN-MINERALS) tablet Take 1 tablet by mouth daily         Allergies:  No Known Allergies    Problem List:    Patient Active Problem List   Diagnosis Code   • Dyspnea R06.00   • Chronic obstructive pulmonary disease ([facility]) J44.9   • PAF (paroxysmal atrial fibrillation) ([facility]) I48.0   • Nonrheumatic aortic valve stenosis I35.0   • Tobacco use Z72.0       Past Medical History:        Diagnosis Date   • Aortic valve stenosis    • COPD (chronic obstructive pulmonary disease) ([facility])        • Former smoker    • Paroxysmal atrial fibrillation ([facility])     MARTINEZ Houston       Past Surgical History:  No past surgical history on file.    Social History:    Social History     Tobacco Use   • Smoking status: Former     Current packs/day: 0.00     Average packs/day: 0.5 packs/day for 50.0 years (25.0 ttl pk-yrs)     Types: Cigarettes     Start date: 1974     Quit date: 2023     Years since quittin.6     Passive exposure: Past   • Smokeless tobacco: Never   Substance Use Topics   • Alcohol use: Not Currently     Alcohol/week: 1.0 standard drink of alcohol     Types: 1 Glasses of wine per week     Comment: glass of wine daily                                Counseling given: Not Answered      Vital Signs (Current): There were no vitals filed for this visit.                                           BP Readings from Last 3 Encounters:   24 136/76   24 132/80   24 130/82       NPO Status:                                                                                 BMI:   Wt Readings from Last 3 Encounters:   24 78.5 kg (173 lb)

## 2024-08-06 NOTE — PROCEDURES
[facility]     Electrophysiology Procedure Note       Date of Procedure: 8/6/2024  Patient's Name: Kinza Puente  YOB: 1950   Medical Record Number: [id number]  Referring Physician: Danielle Garcia APRN - CNP  Procedure Performed by: Yousuf Grant MD    Procedure performed:     Electrophysiology study with left atrial recording and mapping   3-D electroanatomical mapping of the left atrium and  right atium.      Intracardiac echocardiography.   PFA ablation of atrial fibrillation and pulmonary veins isolation      Deployment of closure device on all   access sites.           Indications for procedure:   Symptomatic atrial fibrillation, failed antiarrhythmic therapy and cardioversion in the past   Kinza Puente is a 73 y.o. female   Due to failure of antiarrhythmic therapy in the past, patient has been scheduled to have ablation for symptomatic atrial fibrillation.     Details of Procedure:   The risks, benefits and alternatives of the ablation procedure were discussed with the patient. The risks including, but not limited to, the risks of bleeding, infection, radiation exposure, injury to vascular, cardiac and surrounding structures (including pneumothorax), stroke, cardiac perforation, tamponade, need for emergent open heart surgery, need for pacemaker implantation, esophageal injury and fistula, myocardial infarction and death were discussed in detail. The patient opted to proceed with the ablation. Written informed consent was signed and placed in the chart.     Patient was brought to the EP lab in a fasting non-sedate state. Patient underwent general anesthesia by anesthesia team. We initially performed a transesophageal echo that showed no left atrial appendage clot/thrombus.              Both groins were prepped in a sterile fashion. We gained access in Right femoral vein. A 9-Slovenian sheath for ICE and 6F sheath for CS catheter and an Sr0 sheath for ablation and

## 2024-08-06 NOTE — PROGRESS NOTES
Patient to PACU from Cath lab. Awakens to voice. VSS. NSR on tele. Right groin site intact, figure 8 suture in place, no drainage or swelling. Doppler right pedal pulse. Will monitor.

## 2024-08-06 NOTE — PROGRESS NOTES
Pt resting quietly in bed with eyes closed, awakens easily. VSS, O2 sats 99% on room air. Pt in NSR with HR in 70s. Right groin dry and intact, groin soft, figure of 8 suture remains in place until 1430. Right pedal and posterior tibial pulses palpable, foot warm. Purewick remains in place, small amount of clear yellow urine collected. Pt seen by anesthesia, phase 1 criteria met.

## 2024-08-06 NOTE — PROGRESS NOTES
Discharge instructions reviewed with pt and pts daughter at bedside, both verbalized understanding. Pt safely dressed and ambulated to bathroom to void and around PACU, right groin dressing remains CDI, groin soft. IV removed without complications. Pt seen by DEE Queen with discharge. Pt discharged in wheelchair with all belongings to car by CLARA Lundberg. Pt tolerated well.

## 2024-08-07 LAB
ECHO AV MEAN GRADIENT: 31 MMHG
ECHO AV MEAN VELOCITY: 2.7 M/S
ECHO AV PEAK GRADIENT: 51 MMHG
ECHO AV PEAK VELOCITY: 3.6 M/S
ECHO AV VTI: 102 CM
ECHO BSA: 1.86 M2
EKG ATRIAL RATE: 59 BPM
EKG DIAGNOSIS: NORMAL
EKG P AXIS: 27 DEGREES
EKG P-R INTERVAL: 184 MS
EKG Q-T INTERVAL: 428 MS
EKG QRS DURATION: 90 MS
EKG QTC CALCULATION (BAZETT): 423 MS
EKG R AXIS: 12 DEGREES
EKG T AXIS: 22 DEGREES
EKG VENTRICULAR RATE: 59 BPM

## 2024-08-07 PROCEDURE — 93010 ELECTROCARDIOGRAM REPORT: CPT | Performed by: INTERNAL MEDICINE

## 2024-08-23 ENCOUNTER — OFFICE VISIT (OUTPATIENT)
Dept: PULMONOLOGY | Age: 74
End: 2024-08-23

## 2024-08-23 VITALS
SYSTOLIC BLOOD PRESSURE: 140 MMHG | WEIGHT: 174 LBS | BODY MASS INDEX: 30.83 KG/M2 | HEIGHT: 63 IN | HEART RATE: 69 BPM | OXYGEN SATURATION: 96 % | DIASTOLIC BLOOD PRESSURE: 80 MMHG

## 2024-08-23 DIAGNOSIS — Z87.891 PERSONAL HISTORY OF TOBACCO USE: ICD-10-CM

## 2024-08-23 DIAGNOSIS — J44.9 COPD, MODERATE (HCC): Primary | ICD-10-CM

## 2024-08-23 ASSESSMENT — ENCOUNTER SYMPTOMS
VOMITING: 0
ABDOMINAL PAIN: 0
SINUS PRESSURE: 0
APNEA: 0
VOICE CHANGE: 0
CHEST TIGHTNESS: 0
CHOKING: 0
BLOOD IN STOOL: 0
WHEEZING: 0
DIARRHEA: 0
SORE THROAT: 0
COUGH: 1
COLOR CHANGE: 0
STRIDOR: 0
CONSTIPATION: 0
RHINORRHEA: 0
SHORTNESS OF BREATH: 1
BACK PAIN: 0
ABDOMINAL DISTENTION: 0

## 2024-08-23 NOTE — PROGRESS NOTES
[address] 
[phone] Patient: Vesta Males 
MRN: [id number] :2017 Visit Information [place] sharla Hernándezalainamarjorie Personal Médico Departamento Teléfono del Dep. Número de visita 2018  1:40 PM Roc Barrera, [facility] [phone] [phone] Follow-up Instructions Return for age 2 months. Upcoming Health Maintenance Date Due Hepatitis B Peds Age 0-18 (2 of 3 - Primary Series) 2018 Hib Peds Age 0-5 (1 of 4 - Standard Series) 2018 IPV Peds Age 0-18 (1 of 4 - All-IPV Series) 2018 PCV Peds Age 0-5 (1 of 4 - Standard Series) 2018 Rotavirus Peds Age 0-8M (1 of 3 - 3 Dose Series) 2018 DTaP/Tdap/Td series (1 - DTaP) 2018 MCV through Age 25 (1 of 2) 2028 Alergias  Review Complete El: 2018 Por: MD Yovanny Patterson Duel del:  2018 No Known Allergies Vacunas actuales Riddhi Sagastume Hep B, Adol/Ped 2017  2:06 AM  
  
 No revisadas esta visita You Were Diagnosed With   
  
 Betty Kayla Decreased stooling    -  Primary ICD-10-CM: R19.4 ICD-9-CM: 787.99 Partes vitales Estatus de tabaquísmo Never Assessed [facility] Name Phone [facility] [place], [address] [place] [facility] [phone] Aleman lista de medicamentos actualizada Aviso  As of 2018  2:11 PM  
 No se le ha recetado ningún medicamento. Instrucciones de seguimiento Return for age 2 months. Instrucciones para el Paciente Aprenda acerca de la evacuación del intestino en recién nacidos - [ Learning About Bowel Movements in Newborns ] Con qué frecuencia evacuan el intestino los recién nacidos? Discussed with the patient the current USPSTF guidelines released March 9, 2021 for screening for lung cancer.    For adults aged 50 to 80 years who have a 20 pack-year smoking history and currently smoke or have quit within the past 15 years the grade B recommendation is to:  Screen for lung cancer with low-dose computed tomography (LDCT) every year.  Stop screening once a person has not smoked for 15 years or has a health problem that limits life expectancy or the ability to have lung surgery.    The patient  reports that she quit smoking about 8 months ago. Her smoking use included cigarettes. She started smoking about 50 years ago. She has a 25 pack-year smoking history. She has been exposed to tobacco smoke. She has never used smokeless tobacco.. Discussed with patient the risks and benefits of screening, including over-diagnosis, false positive rate, and total radiation exposure.  The patient currently exhibits no signs or symptoms suggestive of lung cancer.  Discussed with patient the importance of compliance with yearly annual lung cancer screenings and willingness to undergo diagnosis and treatment if screening scan is positive.  In addition, the patient was counseled regarding the importance of remaining smoke free and/or total smoking cessation.    Also reviewed the following if the patient has Medicare that as of February 10, 2022, Medicare only covers LDCT screening in patients aged 50-77 with at least a 20 pack-year smoking history who currently smoke or have quit in the last 15 years   Cada bebé tiene hábitos de [employer]. Muchos recién nacidos tienen por lo menos 1 o 2 evacuaciones al día. Para el final de la primera semana de matt, bryant bebé puede llegar a [employer] 5 y 8 evacuaciones al día. Es posible que bryant bebé evacue después de cada ana lilia. Klaudia a medida que bryant bebé come más y madura kevin david primer mes de matt, la cantidad de evacuaciones podría disminuir. Para las 6 semanas de edad, bryant bebé puede no evacuar todos los [address] [place] no suele ser un problema, siempre y cuando bryant bebé parezca estar cómodo y esté saludable y creciendo, y siempre que las heces no julissa duras. Claudell Acre tienen las evacuaciones del intestino? Las evacuaciones del intestino (también conocidas farhan \"heces\") de bryant recién nacido pueden cambiar mucho en los dileep, [place] y meses siguientes al nacimiento. Las heces pueden ser de muchos colores y texturas [place], y todas pueden ser perfectamente normales para bryant hijo. Las primeras heces que bryant bebé evacua son Vevelyn Bolognese, de color austen rama y pegajosas. Eso se llama meconio. Por lo general, las heces cambian de tener esta apariencia espesa y color austen rama a ser de color jesus en los primeros dileep. Cambiarán a amarillo o a marrón amarillento al final de la primera semana. Las heces de los bebés alimentados con [place] tienden a ser [employer] de los bebés que se alimentan con [employer]. Es normal que las heces de bryant bebé julissa sofía líquidas o pastosas, especialmente si el bebé recibe [place] [place]. Cuándo debe pedir ayuda? Llame a bryant médico ahora mismo o busque atención médica inmediata si: 
· Bryant bebé tiene cualquier síntoma nuevo, por ejemplo, vómitos. · Las heces de bryant bebé son: ¨ De color rojizo oscuro o muy sanguinolentas (con maria dolores). ¨ Negras (y bryant bebé ya ha evacuado meconio). Gun[place] 9. · Bryant bebé evacua mucho más de lo normal para él o marlon. · Las heces de bryant bebé son duras, o el bebé se esfuerza para evacuar. · Las heces de bryant bebé contienen [facility] gran cantidad de mucosidad o de agua. Preste especial atención a los [employer] gigi de bryant hijo y asegúrese de comunicarse con bryant médico si bryant hijo tiene algún problema. La atención de seguimiento es berenice parte clave del tratamiento y la seguridad de bryant hijo. Asegúrese de hacer y acudir a todas las citas, y llame a bryant médico si bryant hijo está teniendo problemas. También es berenice buena idea mantener berenice lista de los medicamentos que ana lilia bryant hijo. Pregúntele a bryant médico cuándo puede esperar [employer] de las pruebas de bryant hijo. Dónde puede encontrar más información en inglés? Yris Bolivar a [email or web address]-[email or web address].[email or web address] Carlyon Yonathan en la búsqueda para aprender más acerca de \"Aprenda acerca de la evacuación del intestino en recién nacidos - [ Learning About Bowel Movements in Newborns ]. \" 
Revisado: 12 carl, 2017 Versión del contenido: 11.4 © 7416-0539 Healthwise, Incorporated. Las instrucciones de cuidado fueron adaptadas bajo licencia por Good Help Connections (which disclaims liability or warranty for this information). Si usted tiene [place] afección médica o sobre estas instrucciones, siempre pregunte a bryant profesional de gigi. Healthwise, Incorporated niega toda garantía o responsabilidad por bryant uso de esta información. Lactancia: Instrucciones de cuidado - [ Breastfeeding: Care Instructions ] Instrucciones de cuidado Amamantar tiene muchos beneficios. Puede disminuir las posibilidades de que bryant bebé contraiga berenice infección. También puede prevenir que bryant bebé tenga problemas farhan diabetes y colesterol alto en un futuro. Amamantar también la ayuda a establecer monique afectivos con bryant bebé. [facility] recomienda amamantar al menos un año.  [place] podría ser muy difícil de hacer para muchas mujeres, shankar amamantar incluso por un período más corto de tiempo es un beneficio para bryant gigi y la de bryant bebé. Kevin los primeros días después del nacimiento, dawna senos producen un líquido espeso y amarillento llamado calostro. Cara líquido le suministra a bryant bebé nutrientes y anticuerpos contra las infecciones. Eso es todo lo que los bebés necesitan kevin los primeros días después del nacimiento. Dawna senos se llenarán de [employer] unos días después del nacimiento. Amamantar es genesis habilidad que mejora con la práctica. Es común tener [employer]. Algunas mujeres tienen los pezones adoloridos o agrietados, obstrucción de los conductos de la leche o infección en los senos (mastitis). Klaudia si alimenta a bryant bebé con genesis frecuencia de 1 a 2 horas kevin el día, y sigue los consejos que se ofrecen en esta hoja, es posible que no tenga estos problemas. Puede tratar estos problemas si se presentan y continuar amamantando. La atención de seguimiento es genesis parte clave de bryant tratamiento y seguridad. Asegúrese de hacer y acudir a todas las citas, y llame a bryant médico si está teniendo problemas. También es genesis buena idea saber los resultados de los exámenes y mantener genesis lista de los medicamentos que ana lilia. Cómo puede cuidarse en el hogar? · Amamante a bryant bebé cada vez que tenga hambre. Kvein las primeras 2 semanas, bryant bebé pedirá alimento con genesis frecuencia de 1 a 3 horas. [place] la ayudará a mantener bryant Adolfo Gentleman. · Ponga genesis almohada o genesis almohada de lactancia en bryant regazo para apoyar los brazos y a bryant bebé. · Sostenga a bryant bebé en genesis posición cómoda. ¨ Puede sostener a bryant bebé de diversas formas. Genesis de las posiciones más comunes es la de la cuna. Un brazo sostiene al bebé con la bryan en la curva de bryant codo. Bryant mano abierta sostiene las nalgas o la espalda del bebé. El vientre de bryant bebé reposa sobre el suyo.  
¨ Si tuvo a bryant bebé por cesárea, trate de sostenerlo en la posición de fútbol americano. Esta posición mantiene a bryant bebé fuera de bryant vientre. Coloque a bryant bebé bajo bryant brazo, con bryant cuerpo a lo richa del lado donde lo amamantará. Sostenga la parte superior del cuerpo de bryant bebé con bryant Eladio Sauers. Con marcin mano usted puede controlar la bryan de bryant bebé para llevar la boca a bryant seno. ¨ Pruebe diferentes posiciones con cada sesión de alimentación. Si está teniendo [place], pídale ayuda a bryant médico o a un asesor de lactancia. · Para conseguir que bryant bebé se prenda: 
¨ Sostenga el seno y estréchelo formando berenice \"U\" con la mano, con bryant pulgar al [employer] exterior del seno y los otros dedos [address] interior. Tika Rocher formar [facility] \"C\" con la mano, con el pulgar sobre el pezón y los otros dedos debajo del pezón. Pruebe las [employer] de sostenerlo para obtener la mejor prendida para toda posición de [employer]V use. Bryant otro brazo estará detrás de la espalda del bebé, con bryant mano dando apoyo a la base de la bryan del bebé. Ubique el pulgar y los otros dedos de la mano de manera que apunten hacia las orejas de bryant bebé. ¨ Puede tocar el labio inferior de bryant bebé con bryant pezón para conseguir que bryant bebé lalito la boca. Espere hasta que bryant bebé la lalito ampliamente, farhan en un bostezo isidra. Y luego asegúrese de acercar a bryant bebé rápidamente hacia el seno, en vez de bryant seno hacia el bebé. A medida que acerca a bryant bebé al seno, use la otra mano para sostener el seno y guiarlo dentro de la boca del bebé. ¨ Tanto el pezón farhan berenice gran parte de la renetta más oscura alrededor del pezón (areola) deben estar en la boca del bebé. Los labios del bebé deben estar doblados hacia afuera, no doblados hacia adentro (invertidos). ¨ Escuche y verifique que haya un patrón regular al succionar y tragar mientras el bebé se está alimentando.  Si no puede bell ni escuchar un patrón al tragar, observe las orejas del bebé, que se moverán levemente cuando el bebé traga. Si le parece que bryant seno obstruye la nariz del bebé, incline la bryan del bebé ligeramente hacia atrás, para que únicamente el borde de berenice fosa nasal esté despejado para respirar. ¨ Cuando bryant bebé se prenda, generalmente puede dejar de sostener el seno con bryant mano y llevarla bajo bryant bebé para acunarlo. Ahora, simplemente relájese y amamante a bryant bebé. · Usted sabrá que bryant bebé se está alimentando ben cuando: ¨ Bryant boca cubre berenice buena parte de la areola y los labios están doblados hacia afuera. ¨ La barbilla y la nariz descansan sobre bryant seno. ¨ La succión es profunda, rítmica y con pausas cortas. ¨ Puede bell y oír cómo traga bryant bebé. ¨ No siente dolor en el pezón. · Si bryant bebé solo ana lilia de un seno en cada sesión, comience la siguiente en el otro. · Cada vez que necesite retirar al bebé de bryant seno, póngale un dedo en la comisura de la boca. Empuje el dedo entre las encías del bebé para interrumpir la succión con suavidad. Si no desprende la boca del bebé de bryant seno antes de retirar a bryant bebé, haley pezones pueden ponerse doloridos, agrietados o amoratados. · Después de alimentar a bryant bebé, kristel unas palmaditas suaves en la espalda para que pueda sacar el aire que haya tragado. Después de que el bebé eructe, vuélvale a ofrecer el mismo seno o el otro. A veces, el bebé querrá continuar alimentándose después de nara eructado. Cuándo debe pedir ayuda? Llame a bryant médico ahora mismo o busque atención médica inmediata si: 
? · Tiene síntomas de berenice infección en el seno, tales farhan: ¨ Mayor dolor, hinchazón, enrojecimiento o temperatura alrededor del seno. ¨ Vetas rojizas que se extienden del seno. ¨ Pus que supura del seno. Seda Mejia. ? · Bryant bebé no ha mojado pañales kevin 6 horas. ?Preste especial atención a los cambios en bryant gigi y asegúrese de comunicarse con bryant médico si: 
? · Bryant bebé tiene dificultades para prenderse al seno. ? · Usted continúa sintiendo dolor o incomodidad al [employer]. ? · Tiene otras preguntas o inquietudes. Dónde puede encontrar más información en inglés? Henrique Latif a [email or web address]-deepak.info/. Catarina Knife [id number] en la búsqueda para aprender más acerca de \"Lactancia: Instrucciones de cuidado - [ Breastfeeding: Care Instructions ]. \" 
Revisado: 16 marzo, 2017 Versión del contenido: 11.4 © 6835-6368 Healthwise, Incorporated. Las instrucciones de cuidado fueron adaptadas bajo licencia por Good Help Connections (which disclaims liability or warranty for this information). Si usted tiene [place] afección médica o sobre estas instrucciones, siempre pregunte a bryant profesional de gigi. Healthwise, Incorporated niega toda garantía o responsabilidad por bryant uso de esta información. Introducing [facility] SERVICES! Estimado padre o  , 
Leonora por solicitar berenice cuenta de MyChart para bryant hijo . Con MyChart , puede bell hospitalarios o de descarga ER instrucciones de bryant hijo , alergias , vacunas actuales y [facility] . Con el fin de acceder a la información de bryant hijo , se requiere un consentimiento firmado el archivo. Por favor, consulte el departamento [facility] o llame [phone] para obtener instrucciones sobre cómo completar berenice solicitud MyChart Proxy . Información Adicional 
 
Si tiene alguna pregunta , por favor visite la sección de preguntas frecuentes del sitio web MyChart en [email or web address] . Recuerde, MyChart NO es que se utilizará para las necesidades urgentes. Para emergencias médicas , llame al 911 . Ahora disponible en bryant iPhone y Android ! Por favor proporcione celeste resumen de la documentación de cuidado a bryant próximo proveedor. If you have any questions after today's visit, please call [phone].

## 2024-08-23 NOTE — PROGRESS NOTES
Kinza Puente    YOB: 1950     Date of Service:  8/23/2024     Chief Complaint   Patient presents with    3 Month Follow-Up    COPD       HPI patient does now status post RFA for atrial fibrillation from 8/6.  States that she ran out of Stiolto Respimat 1 month ago and has noticed that her cough is coming back.  Nonproductive.  Has dyspnea with exertion which persists.  No wheezing.  Quit smoking in December 2023.    No Known Allergies  Outpatient Medications Marked as Taking for the 8/23/24 encounter (Office Visit) with Jet Hernandez MD   Medication Sig Dispense Refill    tiotropium-olodaterol (STIOLTO RESPIMAT) 2.5-2.5 MCG/ACT AERS Inhale 2 puffs into the lungs daily      Elastic Bandages & Supports (MEDICAL COMPRESSION STOCKINGS) MISC 1 each by Does not apply route daily 20 mmHg, closed toe, knee-high, fit to size 1 each 0    metoprolol tartrate (LOPRESSOR) 25 MG tablet TAKE 1/2 TABLET TWICE A DAY BY MOUTH 90 tablet 0    apixaban (ELIQUIS) 5 MG TABS tablet Take 1 tablet by mouth 2 times daily 180 tablet 0    rosuvastatin (CRESTOR) 10 MG tablet TAKE 1 TABLET BY MOUTH EVERY DAY AT NIGHT 90 tablet 1    albuterol sulfate HFA (VENTOLIN HFA) 108 (90 Base) MCG/ACT inhaler Inhale 2 puffs into the lungs 4 times daily as needed for Wheezing 18 g 5    Multiple Vitamins-Minerals (THERAPEUTIC MULTIVITAMIN-MINERALS) tablet Take 1 tablet by mouth daily           There is no immunization history on file for this patient.    Past Medical History:   Diagnosis Date    Aortic valve stenosis     COPD (chronic obstructive pulmonary disease) (HCC)         Former smoker     Paroxysmal atrial fibrillation (HCC)     MARTINEZ [place]     Past Surgical History:   Procedure Laterality Date    EP DEVICE PROCEDURE N/A 8/6/2024    Ablation A-fib w complete ep study performed by Yousuf Grant MD at [facility] CARDIAC CATH LAB     Family History   Problem Relation Age of Onset    Cancer Mother

## 2024-10-07 ENCOUNTER — PATIENT MESSAGE (OUTPATIENT)
Dept: CARDIOLOGY CLINIC | Age: 74
End: 2024-10-07

## 2024-10-31 NOTE — PROGRESS NOTES
EPNP    Patient Active Problem List    Diagnosis Date Noted    PAF (paroxysmal atrial fibrillation) (HCC) 12/28/2023    Nonrheumatic aortic valve stenosis 12/28/2023    Tobacco use 12/28/2023    Chronic obstructive pulmonary disease (HCC) 12/27/2023    Dyspnea 12/26/2023     Diagnostic studies:   ECG 11/7/24  SR, QTcH 410,QRS 87    HALLIE: 8/6/2024    Left Ventricle: Normal left ventricular systolic function with a visually estimated EF of 55 - 60%.    Aortic Valve: Moderate to severe stenosis of the aortic valve. AV peak velocity is 3.6 m/s.    Mitral Valve: Mildly thickened leaflets. Mild to moderate regurgitation.    Tricuspid Valve: Mild regurgitation.    Left Atrium: No left atrial appendage thrombus noted.    Right Atrium: Right atrium is dilated.    Image quality is adequate.     Echo: 3/24   *Left ventricle - normal size and function with EF of 60%   *Aortic valve - thickened, calcified, moderate stenosis with PV of 3.25m/s,   MG 25mmHg, SVI 45   *Tricuspid valve - trivial regurgitation     Echo: 12/23   Left ventricular cavity size is normal. There is increased left ventricular wall thickness.   Global left ventricular function is normal with ejection fraction estimated from 55% to 60%.   Normal diastolic function. Mild mitral regurgitation. Moderate to severe aortic stenosis.   Mild tricuspid regurgitation. RVSP is 20.31 mmHg with RAP of 3 mmHg.     GXT: None     Monitor: 12/23  NSR. Average HR 65, low 50, high 153  AF burden 6%. Average HR 84, low 55, high 139     PFT: 3/24  Moderate obstruction with FEV1 of 60% without bronchodilator response.  Diffusion capacity reduced to 60%.  Normal lung volumes.  This is consistent with moderate COPD.       I independently reviewed the cardiac diagnostic studies, ECG and relevant imaging studies.     No results found for: \"LVEF\", \"LVEFMODE\"  Lab Results   Component Value Date    TSHFT4 0.40 12/27/2023         Physical Examination:  Vitals:    11/07/24 1522   BP: 132/80

## 2024-11-07 ENCOUNTER — OFFICE VISIT (OUTPATIENT)
Dept: CARDIOLOGY CLINIC | Age: 74
End: 2024-11-07

## 2024-11-07 VITALS — HEART RATE: 72 BPM | SYSTOLIC BLOOD PRESSURE: 132 MMHG | DIASTOLIC BLOOD PRESSURE: 80 MMHG

## 2024-11-07 DIAGNOSIS — I35.0 NONRHEUMATIC AORTIC VALVE STENOSIS: ICD-10-CM

## 2024-11-07 DIAGNOSIS — I48.0 PAF (PAROXYSMAL ATRIAL FIBRILLATION) (HCC): Primary | ICD-10-CM

## 2024-11-07 DIAGNOSIS — J44.9 CHRONIC OBSTRUCTIVE PULMONARY DISEASE, UNSPECIFIED COPD TYPE (HCC): ICD-10-CM

## (undated) DEVICE — CATHETER US 8FR L90CM HI RESOL 4 W STEERING PRECIS DOPP

## (undated) DEVICE — CORDIS J-WIRE 80CM

## (undated) DEVICE — 3M™ RED DOT™ REPOSITIONABLE MONITORING ELECTRODE 2670-5, 5/BAG, 200/CASE, 54/PLT: Brand: RED DOT™

## (undated) DEVICE — PAD, DEFIB, ADULT, RADIOTRANS, PHYSIO: Brand: MEDLINE

## (undated) DEVICE — STEERABLE SHEATH CLEAR: Brand: FARADRIVE™

## (undated) DEVICE — 1 X VERSACROSS CONNECT TRANSSEPTAL DILATOR;  1 X VERSACROSS RF WIRE (INCLUDING 1 X CONNECTOR CABLE (SINGLE USE)): Brand: VERSACROSS CONNECT ACCESS SOLUTION FOR FARADRIVE

## (undated) DEVICE — CATHETER CONNECTION CABLE: Brand: FARASTAR™

## (undated) DEVICE — PULSED FIELD ABLATION CATHETER: Brand: FARAWAVE™

## (undated) DEVICE — CATH LAB PACK: Brand: MEDLINE INDUSTRIES, INC.

## (undated) DEVICE — CATHETER EP 5FR L110CM TIP 1MM 2-5-2 SPC L CRV DECAPOLAR

## (undated) DEVICE — PROBE COVER KIT: Brand: MEDLINE INDUSTRIES, INC.

## (undated) DEVICE — ELECTRODE PT RET AD L9FT HI MOIST COND ADH HYDRGEL CORDED

## (undated) DEVICE — CORDIS 8FR 23CM SHEATH

## (undated) DEVICE — ROSEN WIRE

## (undated) DEVICE — PERCLOSE™ PROSTYLE™ SUTURE-MEDIATED CLOSURE AND REPAIR SYSTEM: Brand: PERCLOSE™ PROSTYLE™

## (undated) DEVICE — AMPLATZ SUPER STIFF 0.35 X 180 WIRE

## (undated) DEVICE — CORDIS 6 FR 23CM SHEATH